# Patient Record
Sex: FEMALE | Race: WHITE
[De-identification: names, ages, dates, MRNs, and addresses within clinical notes are randomized per-mention and may not be internally consistent; named-entity substitution may affect disease eponyms.]

---

## 2023-06-27 ENCOUNTER — HOSPITAL ENCOUNTER (OUTPATIENT)
Dept: PHYSICAL THERAPY | Facility: CLINIC | Age: 53
Setting detail: THERAPIES SERIES
Discharge: HOME | End: 2023-06-27
Payer: COMMERCIAL

## 2023-06-27 PROCEDURE — 97110 THERAPEUTIC EXERCISES: CPT

## 2023-06-27 PROCEDURE — 97140 MANUAL THERAPY 1/> REGIONS: CPT

## 2023-07-06 ENCOUNTER — HOSPITAL ENCOUNTER (OUTPATIENT)
Dept: PHYSICAL THERAPY | Facility: CLINIC | Age: 53
Setting detail: THERAPIES SERIES
Discharge: HOME | End: 2023-07-06

## 2023-07-06 NOTE — FLOWSHEET NOTE
[] [address]  [address] [facility] [place] [facility].    [phone]  F: [phone]   [] [address]  [address]   [address]  P: [phone]  F: [phone]  [x] [address]  [address]  P: [phone]  F: [phone] [] \[facility] [place] [facility]\""[facility]  P: [phone]  F: [phone]  [] [address]  [address]   [address]   [place]: [phone]  F: [phone]   [] [address] [facility]  [address] [facility] [address]  [place]: [phone]   F: [phone]     Physical Therapy Cancel/No Show note    Date: 2023  Patient: Boston Lara  : 1970  MRN: [id number]    Cancels/No Shows to date: 1 cx /0 ns    For today's appointment patient:    [x]  Cancelled    []  Rescheduled appointment    []  No-show     Reason given by patient:    []  Patient ill    [x]  Conflicting appointment    []  No transportation      []  Conflict with work    []  No reason given    []  Weather related    []  [id number]    []  Other:      Comments:  Patient left vm stating her  got her concert tickets for tonight so she needs to reschedule.   Returned patient's call to reschedule and left vm.      [] Next appointment was confirmed    Electronically signed by: Malcolm Lee

## 2023-07-25 ENCOUNTER — HOSPITAL ENCOUNTER (OUTPATIENT)
Dept: PHYSICAL THERAPY | Facility: CLINIC | Age: 53
Setting detail: THERAPIES SERIES
Discharge: HOME | End: 2023-07-25
Payer: COMMERCIAL

## 2023-07-25 PROCEDURE — 97140 MANUAL THERAPY 1/> REGIONS: CPT

## 2023-07-25 PROCEDURE — 97110 THERAPEUTIC EXERCISES: CPT

## 2023-07-25 NOTE — FLOWSHEET NOTE
[x] 12 [facility]  Outpatient Rehabilitation &  Therapy  [address] [facility]  P: [phone]  F: [phone] [] [address] [facility]  P: [phone]  F: [phone]     Physical Therapy Daily Treatment Note    Date:  2023  Patient Name:  Alison Jones    :  1970  MRN: [id number]  Physician: Dr. Michelle San: [address] [facility]  Medical Diagnosis:   B Plantar Faciitis                     Rehab Codes: M79.67, R 26.89  Onset date:    22                          Next Dr's appt.: unknown  Visit# / total visits: 3/12    Cancels/No Shows: 0    Subjective:    Pain:  [x] Yes  [] No Location: B feet Pain Rating: (0-10 scale) 5/10  Pain altered Tx:  [x] No  [] Yes  Action:  Comments: No change really but haven't been really good with things. Did some and cut back to 1 day of tennis due to pain.  Getting HS cramping with prone exercises for glute    Objective:  Manual: sidekick to calf and PF B   Precautions: standard  Exercises:  Exercise Reps/ Time Weight/ Level   Comments   Prone           Flying squirrels 2x10        Hip ext (glut max) 2x10        Supine           Nerve flossing  x20 ea    x     1 legged bridges  2x10    x  feet in 8\" step   PB hamstring curls       Hips to remain in neutral to ext   [employer]  2 sets         Sidelying           Clams 90/30 deg           1/2 side plank hip abd 2 sets   x     [employer] walks  2 laps  blue  x     Step downs  2x15 ea  4\"  x Posterior and lateral   Eccentric calf x20ea  x    Lax ball foot roll x15        Burrito calf stretch 2x1'ea    Soleus, gastroc   Hip flexor stretch 2x1'        Tib anterior stretch 2x1'        Toe yoga x20        Other:     Specific Instructions for next treatment:  Recheck  Progress hip strengthening      Treatment Charges: Mins Units   []  Modalities     [x]  Ther Exercise 45 3   [x]  Manual Therapy 15 1   []

## 2023-08-03 ENCOUNTER — HOSPITAL ENCOUNTER (OUTPATIENT)
Dept: PHYSICAL THERAPY | Facility: CLINIC | Age: 53
Setting detail: THERAPIES SERIES
Discharge: HOME | End: 2023-08-03
Payer: COMMERCIAL

## 2023-08-03 PROCEDURE — 97110 THERAPEUTIC EXERCISES: CPT

## 2023-08-03 PROCEDURE — 97140 MANUAL THERAPY 1/> REGIONS: CPT

## 2023-08-03 NOTE — FLOWSHEET NOTE
Standing Anterior Tibialis Stretch  - 1-2 x daily - 7 x weekly - 2 sets - 1' hold  - Toe Yoga - Alternating Great Toe and Lesser Toe Extension  - 1 x daily - 7 x weekly - 3 sets - 10 reps     Comprehension of Education:  [x] Verbalizes understanding. [] Demonstrates understanding. [x] Needs review. [] Demonstrates/verbalizes HEP/Ed previously given. Plan: [x] Continue current frequency toward long and short term goals.     [x] Specific Instructions for subsequent treatments: as above      Time In:4729            Time Out: 0360    Electronically signed by:  Rosmery Silva, PT

## 2023-08-08 ENCOUNTER — HOSPITAL ENCOUNTER (OUTPATIENT)
Dept: PHYSICAL THERAPY | Facility: CLINIC | Age: 53
Setting detail: THERAPIES SERIES
Discharge: HOME | End: 2023-08-08
Payer: COMMERCIAL

## 2023-08-08 PROCEDURE — 97110 THERAPEUTIC EXERCISES: CPT

## 2023-08-08 PROCEDURE — 97140 MANUAL THERAPY 1/> REGIONS: CPT

## 2023-08-08 NOTE — FLOWSHEET NOTE
Vasocompression     [] Gait     []  Other     Total Treatment time 55 3       Assessment: [x] Progressing toward goals. Normal hip extension. Retesting of hip strength and not much progress in this area. Pt reports she does exercise at home but it is programmed exercise with the computer. Still demonstrating contralateral hip drop leading knee valgus and overpronation. [] No change. [] Other:  [x] Patient would continue to benefit from skilled physical therapy services in order to: Increase ankle DF,GTE, hip extension as well as hip and intrinsic foot strengthen to normalize LE mechanics and allow pt to return to walking normal and tennis pain free    STG (to be met in 10 visits)  <3/10 foot pain to allow for normal gait  Normal ankle DF, GTE, hip extension to aide in normal LE mechanics  Hip ext, Abd strength 5/5 B to allow for normal LE mechanics  Able to perform lunges pain free at feet and knees x20 reps FWD and Lat to prepare for return to tennis  IND in HEP with little to no cues     LTG (to be met in 20 visits)  0/10 B foot pain to allow for return to tennis  Pt able to demonstrate good eccentric quad control on 8 inch step for improved control with lunging for tennis  LEFI <10% limitation          Patient goals:relieve pain and gain strength    Pt. Education:  [x] Yes  [] No  [x] Reviewed Prior HEP/Ed  Method of Education: [x] Verbal  [x] Demo  [] Written  Access Code: [email or web address]  URL: [email or web address]  Date: 06/13/2023  Prepared by: Rosmery Silva     Exercises  - Prone Hip Extension with Bent Knee - Two Pillows  - 1 x daily - 7 x weekly - 2 sets - 10 reps  - Prone Heel Squeeze  - 1 x daily - 7 x weekly - 2 sets - 10 reps  - Sidelying Hip Abduction at Wall  - 1 x daily - 7 x weekly - 2 sets - 10 reps  - Hip Flexor Stretch with Chair  - 1-2 x daily - 7 x weekly - 2 sets - 1' hold  - Gastroc Stretch on Wall  - 1-2 x daily - 7 x weekly - 2 sets - 1' hold  - Standing Soleus Stretch  - 1-2 x

## 2023-08-15 ENCOUNTER — HOSPITAL ENCOUNTER (OUTPATIENT)
Dept: PHYSICAL THERAPY | Facility: CLINIC | Age: 53
Setting detail: THERAPIES SERIES
Discharge: HOME | End: 2023-08-15
Payer: COMMERCIAL

## 2023-08-15 PROCEDURE — 97110 THERAPEUTIC EXERCISES: CPT

## 2023-08-15 PROCEDURE — 97140 MANUAL THERAPY 1/> REGIONS: CPT

## 2023-08-15 NOTE — FLOWSHEET NOTE
[x] [facility]  Outpatient Rehabilitation &  Therapy  [address] [facility]  P: [phone]  F: [phone] [] [address] [facility]  P: [phone]  F: [phone]     Physical Therapy Daily Treatment Note    Date:  8/15/2023  Patient Name:  Pooja Traore    :  1970  MRN: [id number]  Physician: Dr. Zabrina Marmolejo: [facility]  Medical Diagnosis:   B Plantar Faciitis                     Rehab Codes: M79.67, R 26.89  Onset date:    22                          Next Dr's appt.: unknown  Visit# / total visits:     Cancels/No Shows: 0    Subjective:    Pain:  [x] Yes  [] No Location: B feet Pain Rating: (0-10 scale) 3-4/10  Pain altered Tx:  [x] No  [] Yes  Action:  Comments: I did not play tennis since     Objective:  Manual: Hypervolt to R calf, sidekick PF B, L soleus   Precautions: standard  Exercises:  Exercise Reps/ Time Weight/ Level   Comments   Prone           Flying squirrels 2x10        Hip ext (glut max) 2x10        Supine           Nerve flossing  x20 ea         1 legged bridges  2x10      feet in 8\" step   PB hamstring curls       Hips to remain in neutral to ext   [employer]  2 sets         Sidelying           Clams 90/30 deg           1/2 side plank hip abd 2 sets        [employer] walks  2 laps  blue  x     Step downs  2x10  8\"  x lateral   Eccentric calf x20ea      Tgym SL sidelying squat 2x10 L18 x    Leg press squats x20 105 x    Seated calf raise x30  44# KB x     Burrito calf stretch 2x1'ea     gastroc   Hip flexor stretch 2x1'        Tib anterior stretch 2x1'        Powerstride 2x10 8\"/12\" 2nd set x    Standing clamshell 2x10 orange x    Tibial raises x20        Other:     Specific Instructions for next treatment:  Recheck  Progress hip strengthening      Treatment Charges: Mins Units   []  Modalities     [x]  Ther Exercise 30 2   [x]  Manual Therapy 15

## 2023-08-22 ENCOUNTER — HOSPITAL ENCOUNTER (OUTPATIENT)
Dept: PHYSICAL THERAPY | Facility: CLINIC | Age: 53
Setting detail: THERAPIES SERIES
Discharge: HOME | End: 2023-08-22
Payer: COMMERCIAL

## 2023-08-22 PROCEDURE — 97110 THERAPEUTIC EXERCISES: CPT

## 2023-08-22 PROCEDURE — 97140 MANUAL THERAPY 1/> REGIONS: CPT

## 2023-08-22 NOTE — FLOWSHEET NOTE
[x] 12 [facility]  Outpatient Rehabilitation &  Therapy  [address] [facility]  P: [phone]  F: [phone] [] [address] [facility]  P: [phone]  F: [phone]     Physical Therapy Daily Treatment Note    Date:  2023  Patient Name:  Prateek Braker    :  1970  MRN: [id number]  Physician: Dr. Carri Buckley: [facility]  Medical Diagnosis:   B Plantar Faciitis                     Rehab Codes: M79.67, R 26.89  Onset date:    22                          Next Dr's appt.: unknown  Visit# / total visits:     Cancels/No Shows: 0    Subjective:    Pain:  [x] Yes  [] No Location: B feet Pain Rating: (0-10 scale) 3-4/10  Pain altered Tx:  [x] No  [] Yes  Action:  Comments: I played tennis so both my achilles and my R PF is painful    Objective:  Manual:IASTM via Hypervolt to B calf, TFL and vastus lateralis, IASTM via sidekick  R PF B, DI R psoas proximal and distal   Precautions: standard  Exercises:  Exercise Reps/ Time Weight/ Level   Comments   Prone           Flying squirrels 2x10        Hip ext (glut max) 2x10        Supine           Nerve flossing  x20 ea         1 legged bridges  2x10    x  feet in 8\" step   PB hamstring curls       Hips to remain in neutral to ext   deadbug  2 sets    x     Sidelying           Reverse clamshell x20 orange x    Clams 30 deg  x20  orange  x     1/2 side plank hip abd 2 sets   x     Gym           Monster walks  2 laps  blue  x     Step downs  2x10  8\"  x lateral   Retro lunge  x10  x    Wall squat x10  x    Hip hinge  x20  x    Eccentric calf x20ea      Tgym SL sidelying squat 2x10 L18     Leg press squats x20 105     Seated calf raise x30  44# KB x     Burrito calf stretch 2x1'ea     gastroc   Hip flexor stretch 2x1'        Tib anterior stretch 2x1'        Powerstride 2x10 8\"/12\" 2nd set     Standing clamshell 2x10 orange x    Tibial raises x20

## 2023-08-31 ENCOUNTER — HOSPITAL ENCOUNTER (OUTPATIENT)
Dept: PHYSICAL THERAPY | Facility: CLINIC | Age: 53
Setting detail: THERAPIES SERIES
Discharge: HOME | End: 2023-08-31
Payer: COMMERCIAL

## 2023-08-31 PROCEDURE — 97110 THERAPEUTIC EXERCISES: CPT

## 2023-08-31 PROCEDURE — 97140 MANUAL THERAPY 1/> REGIONS: CPT

## 2023-08-31 NOTE — FLOWSHEET NOTE
Other:     Specific Instructions for next treatment:  Recheck  Progress hip strengthening      Treatment Charges: Mins Units   []  Modalities     [x]  Ther Exercise 40 3   [x]  Manual Therapy 15 1   []  Ther Activities     []  Neuro Re-ed     []  Vasocompression     [] Gait     []  Other     Total Treatment time 55 4       Assessment: [x] Progressing toward goalsPt is having difficulty hinging hip and stays very quad dominant. Gave hip hinge for HEP this date to improve this as we attempted lunges and pt translates too far forward causing knee pain and putting calf at a length under load position. She continues to test very weak at glute max as well. Worked on getting a neutral spine in standing performing PPT. It is difficult to hold this position. [] No change. [] Other:  [x] Patient would continue to benefit from skilled physical therapy services in order to: Increase ankle DF,GTE, hip extension as well as hip and intrinsic foot strengthen to normalize LE mechanics and allow pt to return to walking normal and tennis pain free    STG (to be met in 10 visits)  <3/10 foot pain to allow for normal gait  Normal ankle DF, GTE, hip extension to aide in normal LE mechanics  Hip ext, Abd strength 5/5 B to allow for normal LE mechanics  Able to perform lunges pain free at feet and knees x20 reps FWD and Lat to prepare for return to tennis  IND in HEP with little to no cues     LTG (to be met in 20 visits)  0/10 B foot pain to allow for return to tennis  Pt able to demonstrate good eccentric quad control on 8 inch step for improved control with lunging for tennis  LEFI <10% limitation          Patient goals:relieve pain and gain strength    Pt. Education:  [x] Yes  [] No  [x] Reviewed Prior HEP/Ed  Method of Education: [x] Verbal  [x] Demo  [] Written  Access Code: [email or web address]  URL: [email or web address]  Date: 06/13/2023  Prepared by: [employer]     Exercises  - Prone Hip Extension with Bent Knee - Two

## 2023-09-07 ENCOUNTER — HOSPITAL ENCOUNTER (OUTPATIENT)
Dept: PHYSICAL THERAPY | Facility: CLINIC | Age: 53
Setting detail: THERAPIES SERIES
Discharge: HOME | End: 2023-09-07
Payer: COMMERCIAL

## 2023-09-07 PROCEDURE — 97140 MANUAL THERAPY 1/> REGIONS: CPT

## 2023-09-07 PROCEDURE — 97110 THERAPEUTIC EXERCISES: CPT

## 2023-09-07 NOTE — FLOWSHEET NOTE
[x] 12 [facility]  Outpatient Rehabilitation &  Therapy  [address]  P: [phone]  F: [phone] [] [address] [facility]  P: [phone]  F: [phone]     Physical Therapy Daily Treatment Note    Date:  2023  Patient Name:  Laney Menendez    :  1970  MRN: [id number]  Physician: Dr. Leilani Vang: [address] [facility]  Medical Diagnosis:   B Plantar Faciitis                     Rehab Codes: M79.67, R 26.89  Onset date:    22                          Next Dr's appt.: unknown  Visit# / total visits:     Cancels/No Shows: 0    Subjective:    Pain:  [x] Yes  [] No Location: B feet Pain Rating: (0-10 scale) 3-4/10  Pain altered Tx:  [x] No  [] Yes  Action:  Comments: I played tennis 2 days.  Worked a little more on my calf    Objective:  Manual:ASTM via sidekick  R PF, R calf , DI R psoas proximal and distal   Precautions: standard  Exercises:  Exercise Reps/ Time Weight/ Level   Comments   Prone           Flying squirrels 2x15   x     Hip ext (glut max) 2x15   x     Supine           Nerve flossing  x20 ea          2x20  x    1 legged bridges  2x10    x  feet in 8\" step   PB hamstring curls       Hips to remain in neutral to ext   deadbug  2 sets    x     Sidelying           Reverse clamshell x20 lime x    Clams 30 deg  x20  lime  x     1/2 side plank hip abd 2 sets   x     Gym           Monster walks  2 laps  blue  x     Step downs  2x10  6\"  x lateral   Retro lunge  x10      Wall squat x30 Red SB x    Hip hinge  x20      Eccentric calf x20ea      Tgym SL sidelying squat 2x10 L18     Leg press squats x20 105     Seated calf raise x30  44# KB      Burrito calf stretch X30\"     Soleus- stopped during    Hip flexor stretch 2x1'        Tib anterior stretch 2x1'        Powerstride 2x10 8\"/12\" 2nd set     Standing clamshell 2x10 lime x    Tibial raises x20        Other:

## 2023-09-19 ENCOUNTER — HOSPITAL ENCOUNTER (OUTPATIENT)
Dept: PHYSICAL THERAPY | Facility: CLINIC | Age: 53
Setting detail: THERAPIES SERIES
Discharge: HOME | End: 2023-09-19
Payer: COMMERCIAL

## 2023-09-19 PROCEDURE — 97110 THERAPEUTIC EXERCISES: CPT

## 2023-09-19 PROCEDURE — 97140 MANUAL THERAPY 1/> REGIONS: CPT

## 2023-09-19 NOTE — FLOWSHEET NOTE
[x] [facility]  Outpatient Rehabilitation &  Therapy  [address]  P: [phone]  F: [phone] [] [facility]  Outpatient Rehabilitation &  Therapy  [address] [facility]  P: [phone]  F: [phone]     Physical Therapy Daily Treatment Note    Date:  2023  Patient Name:  Hamilton Rabago    :  1970  MRN: [id number]  Physician: Dr. Jesus Peña: [facility])  Medical Diagnosis:   B Plantar Faciitis                     Rehab Codes: M79.67, R 26.89  Onset date:    22                          Next 's appt.: unknown  Visit# / total visits: 10/12    Cancels/No Shows: 0    Subjective:    Pain:  [x] Yes  [] No Location: B feet Pain Rating: (0-10 scale) 3-4/10  Pain altered Tx:  [x] No  [] Yes  Action:  Comments: I played tennis and a couple times my legs have an all over ache to them    Objective:  Manual:ASTM via hypervolt B TFL and quad  Precautions: standard  Exercises:  Exercise Reps/ Time Weight/ Level   Comments   Prone           Flying squirrels 2x15        Hip ext (glut max) 2x15        Quad stretch 2'  x    Supine           Nerve flossing  x20 ea         Bridge march 2x20      1 legged bridges  2x10      feet in 8\" step   Nerve glide x20  x    Eccentric  hamstring   2x10    x    deadbug  2 sets         Sidelying           Reverse clamshell x20 lime     Clams 30 deg  x20  lime       1/2 side plank hip abd 2 sets        Gym           Monster walks  2 laps  blue  x     Step downs  2x10  6\"   lateral   Retro lunge  x10      Wall squat x30 Red SB x    Hip hinge  x20      Eccentric calf x20ea  x    Tgym SL sidelying/ supine squat 2x12 L18/L20 x    Leg press squats x20 105     Seated calf raise x30  44# KB      Burrito calf stretch X30\"     Soleus- stopped during    Hip flexor stretch 2x1'        Tib anterior stretch 2x1'        Powerstride 2x10 8\"/12\" 2nd set     Standing clamshell 2x10 lOrange x    Tibial raises x20

## 2023-10-05 ENCOUNTER — HOSPITAL ENCOUNTER (OUTPATIENT)
Dept: PHYSICAL THERAPY | Facility: CLINIC | Age: 53
Setting detail: THERAPIES SERIES
Discharge: HOME | End: 2023-10-05
Payer: COMMERCIAL

## 2023-10-05 PROCEDURE — 97140 MANUAL THERAPY 1/> REGIONS: CPT

## 2023-10-05 PROCEDURE — 97110 THERAPEUTIC EXERCISES: CPT

## 2023-10-05 NOTE — FLOWSHEET NOTE
[x] [facility]  Outpatient Rehabilitation &  Therapy  [address]  P: [phone]  F: [phone] [] [address] [facility]  P: [phone]  F: [phone]     Physical Therapy Daily Treatment Note    Date:  10/5/2023  Patient Name:  Taye Tenorio    :  1970  MRN: [id number]  Physician: Dr. Russel Avelar: [facility]  Medical Diagnosis:   B Plantar Faciitis                     Rehab Codes: M79.67, R 26.89  Onset date:    22                          Next Dr's appt.: unknown  Visit# / total visits:     Cancels/No Shows: 0    Subjective:    Pain:  [x] Yes  [] No Location: B feet Pain Rating: (0-10 scale) 3-4/10  Pain altered Tx:  [x] No  [] Yes  Action:  Comments: I went to [place] and hiked. I felt good. Got new [employer] shoes because the [place] I bought hurt my feet after being in them for just [place] airport.      Objective:  Manual:ASTM via hypervolt B TFL and quad, sidekick B calf and plantar fascia  Precautions: standard  Exercises:  Exercise Reps/ Time Weight/ Level   Comments   Prone           Flying squirrels 2x15        Hip ext (glut max) 2x15        Quad stretch 3x10\"  x    Supine           Nerve flossing  x20 ea         Bridge march 2x20      1 legged bridges  2x10      feet in 8\" step   Nerve glide x20      Eccentric  hamstring   2x10    x    deadbug  2 sets         Sidelying           Reverse clamshell x20 lime     Clams 30 deg  x20  lime       1/2 side plank hip abd 2 sets        Gym           Monster walks  2 laps  blue  x     Step downs  2x10  6\"   lateral   Retro lunge  x10      Wall squat x30 Red SB x    Hip hinge  x20      Eccentric calf x20ea  x    Tgym SL sidelying/ supine squat 2x10 ea L20 x    Leg press squats x20 105     Seated calf raise x30  44# KB      Burrito calf stretch X30\"     Soleus- stopped during    Hip flexor stretch 2x1'

## 2023-10-10 ENCOUNTER — HOSPITAL ENCOUNTER (OUTPATIENT)
Dept: PHYSICAL THERAPY | Facility: CLINIC | Age: 53
Setting detail: THERAPIES SERIES
Discharge: HOME | End: 2023-10-10
Payer: COMMERCIAL

## 2023-10-10 PROCEDURE — 97140 MANUAL THERAPY 1/> REGIONS: CPT

## 2023-10-10 PROCEDURE — 97110 THERAPEUTIC EXERCISES: CPT

## 2023-10-10 NOTE — FLOWSHEET NOTE
[x] 12 [facility]  Outpatient Rehabilitation &  Therapy  [address]  P: [phone]  F: [phone] [] [address] [facility]  P: [phone]  F: [phone]     Physical Therapy Daily Treatment Note    Date:  10/10/2023  Patient Name:  Sidney Arevalo    :  1970  MRN: [id number]  Physician: Dr. Omar Govea: [facility]  Medical Diagnosis:   B Plantar Faciitis                     Rehab Codes: M79.67, R 26.89  Onset date:    22                          Next Dr's appt.: unknown  Visit# / total visits:     Cancels/No Shows: 0    Subjective:    Pain:  [x] Yes  [] No Location: B feet Pain Rating: (0-10 scale) 3-4/10  Pain altered Tx:  [x] No  [] Yes  Action:  Comments: Pt reports her feet still have pain and her achilles B.  Still playing tennis    Objective:  Manual:ASTM via hypervolt B  soleus, R TFL and quad, MFR plantar fascia  Precautions: standard  Exercises:  Exercise Reps/ Time Weight/ Level   Comments   Prone           Flying squirrels 2x15        Hip ext (glut max) 2x15        Quad stretch 3x10\"      Supine           Nerve flossing  x20 ea         Bridge march 2x20      1 legged bridges  2x10    x  f   Nerve glide x20      Eccentric  hamstring   2x10        deadbug  2 sets         Sidelying           Reverse clamshell x20 lime     Clams 30 deg  x20  lime       1/2 side plank hip abd 2 sets        Gym           Monster walks  1 lap  blue  x     Step downs  2x10  6\"   lateral   4 way hip x15ea purple x B   Wall squat x30 Red SB     Hip hinge  x20      Eccentric calf x30ea  x    Tgym SL sidelying/ supine squat 2x10 ea L20 x    Leg press squats x20 105     Seated calf raise x30  44# KB x     Burrito calf stretch X30\"     Soleus- stopped during    Hip flexor stretch 2x1'        Tib anterior stretch 2x1'        Powerstride 2x10 8\"/12\" 2nd set     Standing clamshell 2x10 blue x

## 2023-10-17 ENCOUNTER — HOSPITAL ENCOUNTER (OUTPATIENT)
Dept: PHYSICAL THERAPY | Facility: CLINIC | Age: 53
Setting detail: THERAPIES SERIES
End: 2023-10-17
Payer: COMMERCIAL

## 2023-10-17 ENCOUNTER — HOSPITAL ENCOUNTER (OUTPATIENT)
Dept: PHYSICAL THERAPY | Facility: CLINIC | Age: 53
Setting detail: THERAPIES SERIES
Discharge: HOME | End: 2023-10-17
Payer: COMMERCIAL

## 2023-10-17 PROCEDURE — 97140 MANUAL THERAPY 1/> REGIONS: CPT

## 2023-10-17 PROCEDURE — 97110 THERAPEUTIC EXERCISES: CPT

## 2023-10-17 NOTE — FLOWSHEET NOTE
1 x daily - 7 x weekly - 2 sets - 10 reps  - Hip Flexor Stretch with Chair  - 1-2 x daily - 7 x weekly - 2 sets - 1' hold  - Gastroc Stretch on Wall  - 1-2 x daily - 7 x weekly - 2 sets - 1' hold  - Standing Soleus Stretch  - 1-2 x daily - 7 x weekly - 2 sets - 1'  hold  - Standing Anterior Tibialis Stretch  - 1-2 x daily - 7 x weekly - 2 sets - 1' hold  - Toe Yoga - Alternating Great Toe and Lesser Toe Extension  - 1 x daily - 7 x weekly - 3 sets - 10 reps     Comprehension of Education:  [x] Verbalizes understanding. [] Demonstrates understanding. [x] Needs review. [] Demonstrates/verbalizes HEP/Ed previously given. Plan: [x] Continue current frequency toward long and short term goals.     [x] Specific Instructions for subsequent treatments: as above      Time In:8521      Time Out: 3092    Electronically signed by:  Joanne Lau, PT

## 2023-11-07 ENCOUNTER — HOSPITAL ENCOUNTER (OUTPATIENT)
Dept: PHYSICAL THERAPY | Facility: CLINIC | Age: 53
Setting detail: THERAPIES SERIES
Discharge: HOME | End: 2023-11-07
Payer: COMMERCIAL

## 2023-11-07 PROCEDURE — 97110 THERAPEUTIC EXERCISES: CPT

## 2023-11-07 PROCEDURE — 97140 MANUAL THERAPY 1/> REGIONS: CPT

## 2023-11-07 NOTE — FLOWSHEET NOTE
[x] [facility]  Outpatient Rehabilitation &  Therapy  [address]  P: [phone]  F: [phone] [] [address] [facility]  P: [phone]  F: [phone]     Physical Therapy Daily Treatment Note    Date:  2023  Patient Name:  Seth Rodriguez    :  1970  MRN: [id number]  Physician: Dr. Cece Garcia: [facility]  Medical Diagnosis:   B Plantar Faciitis                     Rehab Codes: M79.67, R 26.89  Onset date:    22                          Next Dr's appt.: unknown  Visit# / total visits:     Cancels/No Shows: 0    Subjective:    Pain:  [x] Yes  [] No Location: B feet Pain Rating: (0-10 scale) 2/10  Pain altered Tx:  [x] No  [] Yes  Action:  Comments: Pt reports she just finished tennis so feet hurt.  Pain is just hanging steady    Objective:  Manual:ASTM via sidekick B plantar fascia, IASTM via hypervolt to B calves, MFR intrinsics  Precautions: standard  Exercises:  Exercise Reps/ Time Weight/ Level   Comments   Prone           Flying squirrels 2x15        Hip ext (glut max) 2x15        Quad stretch 3x10\"      Supine           Nerve flossing  x20 ea         Bridge march 2x20      1 legged bridges  2x10         Nerve glide x20      Eccentric  hamstring   2x10        deadbug  2 sets         Sidelying           Reverse clamshell x20 lime     Clams 30 deg  x20  lime       1/2 side plank hip abd 2 sets        Gym           Monster walks  2 laps  blue       Step downs  2x10  6\"   lateral   4 way hip x15ea purple  B   Wall squat x30 Red SB     Hip hinge  x20      Eccentric calf on incline board x30ea      Tgym SL sidelying/ supine squat 2x10 ea L20     Leg press squats x20 105     Seated calf raise x30  44# KB      Burrito calf stretch 2x1'   x  Soleus   Calf isometric 2x10   x  toes on towel leaning in to wall   Tib anterior stretch 2x1'        Great toe activation drill

## 2023-11-28 ENCOUNTER — HOSPITAL ENCOUNTER (OUTPATIENT)
Dept: PHYSICAL THERAPY | Facility: CLINIC | Age: 53
Setting detail: THERAPIES SERIES
Discharge: HOME | End: 2023-11-28
Payer: COMMERCIAL

## 2023-11-28 PROCEDURE — 97110 THERAPEUTIC EXERCISES: CPT

## 2023-11-28 PROCEDURE — 97140 MANUAL THERAPY 1/> REGIONS: CPT

## 2023-11-28 NOTE — FLOWSHEET NOTE
[x] [facility]  Outpatient Rehabilitation &  Therapy  [address]  P: [phone]  F: [phone] [] [address] [facility]  P: [phone]  F: [phone]     Physical Therapy Daily Treatment Note    Date:  2023  Patient Name:  Fransico Callejas    :  1970  MRN: [id number]  Physician: Dr. Iglesias Current: [facility]  Medical Diagnosis:   B Plantar Faciitis                     Rehab Codes: M79.67, R 26.89  Onset date:    22                          Next Dr's appt.: unknown  Visit# / total visits:     Cancels/No Shows: 0    Subjective:    Pain:  [x] Yes  [] No Location: B feet Pain Rating: (0-10 scale) 2/10  Pain altered Tx:  [x] No  [] Yes  Action:  Comments: Pt reports she did the [place] and ran the entire race.  Feet and especially the L hamstring hurt post and L hip cramped up    Objective:  Manual:ASTM via sidekick B plantar fascia, IASTM via hypervolt to B calves, MFR intrinsics  Precautions: standard  Exercises:  Exercise Reps/ Time Weight/ Level   Comments   Prone           Flying squirrels 2x15        Hip ext (glut max) 2x15        Quad stretch 3x10\"      Supine           Eccentric hamstring  x20 ea    x     Bridge march 2x20      1 legged bridges  2x10         Nerve glide x20      Eccentric  hamstring   2x10        deadbug  2 sets         Sidelying           Reverse clamshell x20 lime     Clams 30 deg  x20  lime       1/2 side plank hip abd 2 sets        Gym           Monster walks  2 laps  blue       Step downs  2x10  6\"   lateral   4 way hip x15ea purple  B   Wall squat x30 Red SB     Hip hinge  x20      Eccentric calf on incline board x30ea      Tgym SL sidelying/ supine squat 2x10 ea L20     Leg press squats x20 105     Seated calf raise x30  44# KB      Burrito calf stretch 2x1'   x  Soleus   Calf isometric 2x10     toes on towel leaning in to wall   Tib

## 2024-06-27 ENCOUNTER — CLINICAL DOCUMENTATION (OUTPATIENT)
Dept: PHYSICAL THERAPY | Facility: CLINIC | Age: 54
End: 2024-06-27

## 2024-06-27 NOTE — FLOWSHEET NOTE
[] [facility]  Outpatient Rehabilitation &  Therapy  [address]  [phone]  [phone] [] [facility]  Outpatient Rehabilitation &  Therapy  [address]  P: [phone]  F: [phone] [] [facility]  Outpatient Rehabilitation &  Therapy  [address]  P: [phone]  F: [phone] [] [facility]  Outpatient Rehabilitation &  Therapy  [address]  [phone]  [phone] [] [facility]  Outpatient Rehabilitation &  Therapy  [address]   P: [phone]  F: [phone]  [] [facility]  Outpatient Rehabilitation &  Therapy  [address]  P: [phone]  F: [phone] [] [facility]  Outpatient Rehabilitation &  Therapy  [address]  P: [phone]  F: [phone] [] [facility]  Outpatient Rehabilitation &  Therapy  [address]  P: [phone]  F: [phone] [] [facility]  Outpatient Rehabilitation &  Therapy  [address]  P: [phone]  F: [phone]  [] [facility] Outpatient Rehabilitation &  Therapy  [address]  P: [phone]  F: [phone]     Physical Therapy Discharge Note    Date: 2024      Patient: Nova Hernandez  : 1970  MRN: [id number]    Physician: Dr. Igor Hernandez                              Insurance: [employer] ([facility])  Medical Diagnosis:   B Plantar Faciitis                     Rehab Codes: M79.67, R 26.89  Onset date:    22                          Next Dr's appt.: unknown  Total visits attended: 14  Cancels/No shows: 0/0  Date of initial visit: 23                Date of final visit: 23      Subjective:  Pain:  [x] Yes  [] No Location: B feet            Pain Rating: (0-10 scale) 210  Pain altered Tx:  [x] No  [] Yes  Action:  Comments: Pt reports she

## 2025-06-10 ENCOUNTER — HOSPITAL ENCOUNTER (OUTPATIENT)
Dept: PHYSICAL THERAPY | Facility: CLINIC | Age: 55
Setting detail: THERAPIES SERIES
Discharge: HOME | End: 2025-06-10
Payer: COMMERCIAL

## 2025-06-10 PROCEDURE — 97110 THERAPEUTIC EXERCISES: CPT

## 2025-06-10 PROCEDURE — 97140 MANUAL THERAPY 1/> REGIONS: CPT

## 2025-06-10 PROCEDURE — 97161 PT EVAL LOW COMPLEX 20 MIN: CPT

## 2025-06-10 NOTE — CONSULTS
[] [facility]  Outpatient Rehabilitation &  Therapy  [address]  [phone]  [phone] [] [facility]  Outpatient Rehabilitation &  Therapy  [address]  P: [phone]  F: [phone] [x] [facility]  Outpatient Rehabilitation &  Therapy  [address]  P: [phone]  F: [phone] [] [facility]  Outpatient Rehabilitation &  Therapy  [address]  [phone]  [phone] [] [facility]  Outpatient Rehabilitation &  Therapy  [address]   P: [phone]  F: [phone]  [] [facility]  Outpatient Rehabilitation &  Therapy  [address]  P: [phone]  F: [phone] [] [facility]  Outpatient Rehabilitation &  Therapy  [address]  P: [phone]  F: [phone] [] [facility]  Outpatient Rehabilitation &  Therapy  [address]  P: [phone]  F: [phone] [] [facility]  Outpatient Rehabilitation &  Therapy  [address]  P: [phone]  F: [phone]  [] [facility] Outpatient Rehabilitation &  Therapy  [address]  P: [phone]  F: [phone]     Physical Therapy Lower Extremity Evaluation    Date:  6/10/2025  Patient: Nova Hernandez  : 1970  MRN: [id number]  Physician: Igor Hernandez MD  Insurance: [employer] [facility]( [facility])  Medical Diagnosis: S76.011A (ICD-10-CM) - Strain of muscle, fascia and tendon of right hip, initial encounter  Rehab Codes: M25.551  Onset date: 3/29/25    Next 's appt.: unknown    Subjective:   CC:  Per Dr. Hernandez's note 25:   Nova Hernandez is a 54 y.o. female.  Hip Pain  Patient complains of right hip pain. Onset of the symptoms was 6 weeks ago. Inciting event: none. The patient reports the hip pain is aggravated by walking. Associated symptoms:

## 2025-06-17 ENCOUNTER — HOSPITAL ENCOUNTER (OUTPATIENT)
Dept: PHYSICAL THERAPY | Facility: CLINIC | Age: 55
Setting detail: THERAPIES SERIES
Discharge: HOME | End: 2025-06-17
Payer: COMMERCIAL

## 2025-06-17 PROCEDURE — 97110 THERAPEUTIC EXERCISES: CPT

## 2025-06-17 PROCEDURE — 97140 MANUAL THERAPY 1/> REGIONS: CPT

## 2025-06-17 NOTE — FLOWSHEET NOTE
form without cues.     LTG: (to be met in 20 treatments)  0/10 pain R hip   Able to demonstrate good eccentric quad control in a glute dominant fashion on 8 inch step to allow to to control loads of running and lunging  for full return to tennis  LEFS 0% limitation                    Patient goals: Get strong enough to run so can play tennis    Pt. Education:  [x] Yes  [] No  [x] Reviewed Prior HEP/Ed  Method of Education: [x] Verbal  [x] Demo  [x] Written  Access Code: [email or web address]  URL: [email or web address]  Date: 06/17/2025  Prepared by: Pelon Lazar    Exercises  - Sidelying Hip Abduction at Wall  - 2 x daily - 7 x weekly - 2-3 sets - 10 reps  - single leg bridge with band hold  - 2 x daily - 7 x weekly - 2-3 sets - 10 reps  - single leg bent knee heel raises at the wall  - 2 x daily - 7 x weekly - 2 sets - 10-20 reps  - Clamshell with Resistance  - 2 x daily - 7 x weekly - 2 sets - 10-20 reps  - Standing Diagonal Hip Extension and External Rotation  - 2 x daily - 7 x weekly - 2 sets - 10-20 reps  - standing in runner position with banded hip extension  - 2 x daily - 7 x weekly - 2 sets - 10-20 reps    Comprehension of Education:  [x] Verbalizes understanding.  [] Demonstrates understanding.  [x] Needs review.  [] Demonstrates/verbalizes HEP/Ed previously given.     Plan: [x] Continue current frequency toward long and short term goals.    [x] Specific Instructions for subsequent treatments: as above      Time In:1700            Time Out: 1750    Electronically signed by:  PELON LAZAR PT

## 2025-06-26 ENCOUNTER — HOSPITAL ENCOUNTER (OUTPATIENT)
Dept: PHYSICAL THERAPY | Facility: CLINIC | Age: 55
Setting detail: THERAPIES SERIES
Discharge: HOME | End: 2025-06-26
Payer: COMMERCIAL

## 2025-06-26 PROCEDURE — 97140 MANUAL THERAPY 1/> REGIONS: CPT

## 2025-06-26 PROCEDURE — 97110 THERAPEUTIC EXERCISES: CPT

## 2025-06-26 NOTE — FLOWSHEET NOTE
[] [facility]  Outpatient Rehabilitation &  Therapy  [address]  [phone]  [phone] [] [facility]  Outpatient Rehabilitation &  Therapy  [address]  P: [phone]  F: [phone] [x] [facility]  Outpatient Rehabilitation &  Therapy  [address]  P: [phone]  F: [phone] [] [facility]  Outpatient Rehabilitation &  Therapy  [address]  [phone]  [phone] [] [facility]  Outpatient Rehabilitation &  Therapy  [address]   P: [phone]  F: [phone]  [] [facility]  Outpatient Rehabilitation &  Therapy  [address]  P: [phone]  F: [phone] [] [facility]  Outpatient Rehabilitation &  Therapy  [address]  P: [phone]  F: [phone] [] [facility]  Outpatient Rehabilitation &  Therapy  [address]  P: [phone]  F: [phone] [] [facility]  Outpatient Rehabilitation &  Therapy  [address]  P: [phone]  F: [phone]  [] [facility] Outpatient Rehabilitation &  Therapy  [address]  P: [phone]  F: [phone]     Physical Therapy Daily Treatment Note    Date:  2025  Patient Name:  Nova Hernandez    :  1970  MRN: [id number]  Physician: Igor Hernandez MD  Insurance: [place] [employer]( [facility])  Medical Diagnosis: S76.011A (ICD-10-CM) - Strain of muscle, fascia and tendon of right hip, initial encounter       Rehab Codes: M25.551  Onset date: 3/29/25                 Next Dr's appt.: unknown  Visit# / total visits: 3/20     Cancels/No Shows: 0/0    Subjective:    Pain:  [x] Yes  [] No Location: R hip Pain Rating: (0-10 scale) 4/10  Pain altered Tx:  [x] No  [] Yes  Action:  Comments: Doing OK just front of hip and groin feel

## 2025-07-10 ENCOUNTER — HOSPITAL ENCOUNTER (OUTPATIENT)
Dept: PHYSICAL THERAPY | Facility: CLINIC | Age: 55
Setting detail: THERAPIES SERIES
Discharge: HOME | End: 2025-07-10
Payer: COMMERCIAL

## 2025-07-10 PROCEDURE — 97110 THERAPEUTIC EXERCISES: CPT

## 2025-07-10 PROCEDURE — 97140 MANUAL THERAPY 1/> REGIONS: CPT

## 2025-07-10 NOTE — FLOWSHEET NOTE
[] [facility]  Outpatient Rehabilitation &  Therapy  [address]  [phone]  [phone] [] [facility]  Outpatient Rehabilitation &  Therapy  [address]  P: [phone]  F: [phone] [x] [facility]  Outpatient Rehabilitation &  Therapy  [address]  P: [phone]  F: [phone] [] [facility]  Outpatient Rehabilitation &  Therapy  [address]  [phone]  [phone] [] [facility]  Outpatient Rehabilitation &  Therapy  [address]   P: [phone]  F: [phone]  [] [facility]  Outpatient Rehabilitation &  Therapy  [address]  P: [phone]  F: [phone] [] [facility]  Outpatient Rehabilitation &  Therapy  [address]  P: [phone]  F: [phone] [] [facility]  Outpatient Rehabilitation &  Therapy  [address]  P: [phone]  F: [phone] [] [facility]  Outpatient Rehabilitation &  Therapy  [address]  P: [phone]  F: [phone]  [] [facility] Outpatient Rehabilitation &  Therapy  [address]  P: [phone]  F: [phone]     Physical Therapy Daily Treatment Note    Date:  7/10/2025  Patient Name:  Nova Hernandez    :  1970  MRN: [id number]  Physician: Igor Hernandez MD  [place] [employer]( [facility])  Medical Diagnosis: S76.011A (ICD-10-CM) - Strain of muscle, fascia and tendon of right hip, initial encounter       Rehab Codes: M25.551  Onset date: 3/29/25                 Next Dr's appt.: unknown  Visit# / total visits: 4/20     Cancels/No Shows: 0/0    Subjective:    Pain:  [x] Yes  [] No Location: R hip Pain Rating: (0-10 scale) 2/10  Pain altered Tx:  [x] No  [] Yes  Action:  Comments: Definite improvement. HEP going well.     Objective:  Manual: MESERET

## 2025-07-15 ENCOUNTER — HOSPITAL ENCOUNTER (OUTPATIENT)
Dept: PHYSICAL THERAPY | Facility: CLINIC | Age: 55
Setting detail: THERAPIES SERIES
Discharge: HOME | End: 2025-07-15
Payer: COMMERCIAL

## 2025-07-15 PROCEDURE — 97110 THERAPEUTIC EXERCISES: CPT

## 2025-07-15 PROCEDURE — 97140 MANUAL THERAPY 1/> REGIONS: CPT

## 2025-07-15 NOTE — FLOWSHEET NOTE
[] [facility]  Outpatient Rehabilitation &  Therapy  [address]  [phone]  [phone] [] [facility]  Outpatient Rehabilitation &  Therapy  [address]  P: [phone]  F: [phone] [x] [facility]  Outpatient Rehabilitation &  Therapy  [address]  P: [phone]  F: [phone] [] [facility]  Outpatient Rehabilitation &  Therapy  [address]  [phone]  [phone] [] [facility]  Outpatient Rehabilitation &  Therapy  [address]   P: [phone]  F: [phone]  [] [facility]  Outpatient Rehabilitation &  Therapy  [address]  P: [phone]  F: [phone] [] [facility]  Outpatient Rehabilitation &  Therapy  [address]  P: [phone]  F: [phone] [] [facility]  Outpatient Rehabilitation &  Therapy  [address]  P: [phone]  F: [phone] [] [facility]  Outpatient Rehabilitation &  Therapy  [address]  P: [phone]  F: [phone]  [] [facility] Outpatient Rehabilitation &  Therapy  [address]  P: [phone]  F: [phone]     Physical Therapy Daily Treatment Note    Date:  7/15/2025  Patient Name:  Nova Hernandez    :  1970  MRN: [id number]  Physician: Igor Hernandez MD  [place] [employer]( [facility])  Medical Diagnosis: S76.011A (ICD-10-CM) - Strain of muscle, fascia and tendon of right hip, initial encounter       Rehab Codes: M25.551  Onset date: 3/29/25                 Next Dr's appt.: unknown  Visit# / total visits: 5/20     Cancels/No Shows: 0/0    Subjective:    Pain:  [x] Yes  [] No Location: R hip Pain Rating: (0-10 scale) 2/10  Pain altered Tx:  [x] No  [] Yes  Action:  Comments: I feel like my hip got irritated after exercises last

## 2025-07-29 ENCOUNTER — HOSPITAL ENCOUNTER (OUTPATIENT)
Dept: PHYSICAL THERAPY | Facility: CLINIC | Age: 55
Setting detail: THERAPIES SERIES
Discharge: HOME | End: 2025-07-29
Payer: COMMERCIAL

## 2025-07-29 PROCEDURE — 97530 THERAPEUTIC ACTIVITIES: CPT

## 2025-07-29 PROCEDURE — 97164 PT RE-EVAL EST PLAN CARE: CPT

## 2025-07-29 NOTE — FLOWSHEET NOTE
you have any symptoms of prolapse? Explain.  [x] [] Working with hormone therapist and is taking estrogen tablets vaginally, progesterone at night, and testosterone pellets.    Are you currently breastfeeding?  [] [x]    Patient states currently she feels swollen at her clitoral area and hypersensitive   Pregnancy   Yes No Comments          History of pregnancy?  [x] [] Reports a lot of miscarriages.   States she has Hashimoto's and was diagnosed as she was pregnant.  States she had mold exposure recently and is working with functional med the last 6 months to manage this.    Vaginal birth?  [x] [] 2   Explain birth. Did you push for a long time? Any use of forceps or vacuum? Tearing or episiotomy?    Episiotomy, recovered well      Sexual function  Yes No Comments          Are you able to lubricate naturally?  [] [x] DHA cream helps a lot   What type of lubricant do you use?  [] [x] DHEA cream on outside    Are you able to orgasm?  [x] []    Has there been a change in intensity of an orgasm?  [x] [] More intense since starting testosterone    Do you have pain during or after intercourse?  [] [] No pain currently  Patient had severe pain with intercourse, redness and swelling.     Patient had recurrent yeast infections previously.    Patient went to [facility] for 1 year due to chronic yeast infections which caused crazy redness and swelling. This was 10-15 years ago.     Patient verbalizes consent to internal and external pelvic floor examination without chaperone present.       OBSERVATION No Deficit Deficit Not Tested Comments   Posture           Forward Head [x]  []  []      Rounded Shoulders [x]  []  []      Kyphosis []  []  []      Lordosis []  []  []     Lateral Shift []  []  []      Palpation External []  []  []  Moderate tissue restriction throughout trunk with upper abdominal gripping and decreased rib expansion. Patient reports hypersensitivity over clitoral area.    Skin color/integrity []

## 2025-08-01 ENCOUNTER — HOSPITAL ENCOUNTER (OUTPATIENT)
Dept: PHYSICAL THERAPY | Facility: CLINIC | Age: 55
Setting detail: THERAPIES SERIES
Discharge: HOME | End: 2025-08-01
Payer: COMMERCIAL

## 2025-08-01 PROCEDURE — 97140 MANUAL THERAPY 1/> REGIONS: CPT

## 2025-08-01 PROCEDURE — 97110 THERAPEUTIC EXERCISES: CPT

## 2025-08-01 NOTE — FLOWSHEET NOTE
[] [facility]  Outpatient Rehabilitation &  Therapy  [address]  [phone]  [phone] [] [facility]  Outpatient Rehabilitation &  Therapy  [address]  P: [phone]  F: [phone] [x] [facility]  Outpatient Rehabilitation &  Therapy  [address]  P: [phone]  F: [phone] [] [facility]  Outpatient Rehabilitation &  Therapy  [address]  [phone]  [phone] [] [facility]  Outpatient Rehabilitation &  Therapy  [address]   P: [phone]  F: [phone]  [] [facility]  Outpatient Rehabilitation &  Therapy  [address]  P: [phone]  F: [phone] [] [facility]  Outpatient Rehabilitation &  Therapy  [address]  P: [phone]  F: [phone] [] [facility]  Outpatient Rehabilitation &  Therapy  [address]  P: [phone]  F: [phone] [] [facility]  Outpatient Rehabilitation &  Therapy  [address]  P: [phone]  F: [phone]  [] [facility] Outpatient Rehabilitation &  Therapy  [address]  P: [phone]  F: [phone]     Physical Therapy Daily Treatment Note    Date:  2025  Patient Name:  Nova Hernandez    :  1970  MRN: [id number]  Physician: Igor Hernandez MD  [place] [employer] [employer]( [facility]  Medical Diagnosis: S76.011A (ICD-10-CM) - Strain of muscle, fascia and tendon of right hip, initial encounter       Rehab Codes: M25.551  Onset date: 3/29/25                 Next Dr's appt.: unknown  Visit# / total visits: 6/20     Cancels/No Shows: 0/0    Subjective:    Pain:  [x] Yes  [] No Location: R hip Pain Rating: (0-10 scale) 0/10  Pain altered Tx:  [x] No  [] Yes  Action:  Comments: I am just sore today but no pain. Appointment with pelvic health

## 2025-08-07 ENCOUNTER — HOSPITAL ENCOUNTER (OUTPATIENT)
Dept: PHYSICAL THERAPY | Facility: CLINIC | Age: 55
Setting detail: THERAPIES SERIES
Discharge: HOME | End: 2025-08-07
Payer: COMMERCIAL

## 2025-08-07 PROCEDURE — 97110 THERAPEUTIC EXERCISES: CPT

## 2025-08-07 PROCEDURE — 97140 MANUAL THERAPY 1/> REGIONS: CPT

## 2025-08-12 ENCOUNTER — HOSPITAL ENCOUNTER (OUTPATIENT)
Dept: PHYSICAL THERAPY | Facility: CLINIC | Age: 55
Setting detail: THERAPIES SERIES
Discharge: HOME | End: 2025-08-12
Payer: COMMERCIAL

## 2025-08-12 PROCEDURE — 97140 MANUAL THERAPY 1/> REGIONS: CPT

## 2025-08-12 PROCEDURE — 97110 THERAPEUTIC EXERCISES: CPT

## 2025-08-15 ENCOUNTER — HOSPITAL ENCOUNTER (OUTPATIENT)
Dept: PHYSICAL THERAPY | Facility: CLINIC | Age: 55
Setting detail: THERAPIES SERIES
Discharge: HOME | End: 2025-08-15
Payer: COMMERCIAL

## 2025-08-15 PROCEDURE — 97140 MANUAL THERAPY 1/> REGIONS: CPT

## 2025-08-15 PROCEDURE — 97110 THERAPEUTIC EXERCISES: CPT

## 2025-08-19 ENCOUNTER — HOSPITAL ENCOUNTER (OUTPATIENT)
Dept: PHYSICAL THERAPY | Facility: CLINIC | Age: 55
Setting detail: THERAPIES SERIES
Discharge: HOME | End: 2025-08-19
Payer: COMMERCIAL

## 2025-08-19 PROCEDURE — 97110 THERAPEUTIC EXERCISES: CPT

## 2025-08-19 PROCEDURE — 97140 MANUAL THERAPY 1/> REGIONS: CPT

## 2025-08-28 ENCOUNTER — HOSPITAL ENCOUNTER (OUTPATIENT)
Dept: PHYSICAL THERAPY | Facility: CLINIC | Age: 55
Setting detail: THERAPIES SERIES
Discharge: HOME | End: 2025-08-28
Payer: COMMERCIAL

## 2025-08-28 PROCEDURE — 97140 MANUAL THERAPY 1/> REGIONS: CPT

## 2025-08-28 PROCEDURE — 97530 THERAPEUTIC ACTIVITIES: CPT
